# Patient Record
Sex: MALE | Race: WHITE | NOT HISPANIC OR LATINO | Employment: FULL TIME | URBAN - NONMETROPOLITAN AREA
[De-identification: names, ages, dates, MRNs, and addresses within clinical notes are randomized per-mention and may not be internally consistent; named-entity substitution may affect disease eponyms.]

---

## 2017-08-25 ENCOUNTER — OFFICE VISIT (OUTPATIENT)
Dept: FAMILY MEDICINE CLINIC | Facility: CLINIC | Age: 22
End: 2017-08-25

## 2017-08-25 VITALS
HEART RATE: 80 BPM | WEIGHT: 247 LBS | DIASTOLIC BLOOD PRESSURE: 84 MMHG | BODY MASS INDEX: 35.36 KG/M2 | OXYGEN SATURATION: 98 % | SYSTOLIC BLOOD PRESSURE: 136 MMHG | HEIGHT: 70 IN

## 2017-08-25 DIAGNOSIS — M54.42 CHRONIC MIDLINE LOW BACK PAIN WITH LEFT-SIDED SCIATICA: Primary | ICD-10-CM

## 2017-08-25 DIAGNOSIS — G89.29 CHRONIC MIDLINE LOW BACK PAIN WITH LEFT-SIDED SCIATICA: Primary | ICD-10-CM

## 2017-08-25 PROCEDURE — 99213 OFFICE O/P EST LOW 20 MIN: CPT | Performed by: NURSE PRACTITIONER

## 2017-08-25 RX ORDER — GABAPENTIN 300 MG/1
300 CAPSULE ORAL 3 TIMES DAILY
Qty: 90 CAPSULE | Refills: 2 | Status: SHIPPED | OUTPATIENT
Start: 2017-08-25 | End: 2017-11-21 | Stop reason: SDUPTHER

## 2017-08-25 RX ORDER — IBUPROFEN 600 MG/1
TABLET ORAL
Qty: 60 TABLET | Refills: 2 | Status: SHIPPED | OUTPATIENT
Start: 2017-08-25 | End: 2017-11-21 | Stop reason: SDUPTHER
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
## 2017-09-11 RX ORDER — TRAMADOL HYDROCHLORIDE 50 MG/1
TABLET ORAL
Qty: 60 TABLET | Refills: 0 | Status: SHIPPED | OUTPATIENT
Start: 2017-09-11 | End: 2017-11-21

## 2017-09-12 DIAGNOSIS — G89.29 CHRONIC LOW BACK PAIN, UNSPECIFIED BACK PAIN LATERALITY, WITH SCIATICA PRESENCE UNSPECIFIED: Primary | ICD-10-CM

## 2017-09-12 DIAGNOSIS — M54.5 CHRONIC LOW BACK PAIN, UNSPECIFIED BACK PAIN LATERALITY, WITH SCIATICA PRESENCE UNSPECIFIED: Primary | ICD-10-CM

## 2017-09-15 ENCOUNTER — HOSPITAL ENCOUNTER (OUTPATIENT)
Dept: PHYSICAL THERAPY | Facility: HOSPITAL | Age: 22
Setting detail: THERAPIES SERIES
Discharge: HOME | End: 2017-09-15

## 2017-09-15 DIAGNOSIS — M54.5 CHRONIC BILATERAL LOW BACK PAIN, WITH SCIATICA PRESENCE UNSPECIFIED: Primary | ICD-10-CM

## 2017-09-15 DIAGNOSIS — G89.29 CHRONIC BILATERAL LOW BACK PAIN, WITH SCIATICA PRESENCE UNSPECIFIED: Primary | ICD-10-CM

## 2017-09-15 PROCEDURE — 97161 PT EVAL LOW COMPLEX 20 MIN: CPT | Performed by: PHYSICAL THERAPIST

## 2017-09-15 PROCEDURE — 97110 THERAPEUTIC EXERCISES: CPT | Performed by: PHYSICAL THERAPIST

## 2017-09-15 NOTE — THERAPY EVALUATION
Outpatient Physical Therapy Ortho Initial Evaluation  [facility]     Patient Name: Fabricio Ware  : 1995  MRN: [id number]  Today's Date: 9/15/2017      Visit Date: 09/15/2017  Visit   Return to MD: NELY  Re-cert date: 10/6/17    There is no problem list on file for this patient.       History reviewed. No pertinent past medical history.     Past Surgical History:   Procedure Laterality Date   • EYE SURGERY         Visit Dx:     ICD-10-CM ICD-9-CM   1. Chronic bilateral low back pain, with sciatica presence unspecified M54.5 724.2    G89.29 338.29     Outpatient Medications     gabapentin (NEURONTIN) 300 MG capsule      ibuprofen (ADVIL,MOTRIN) 600 MG tablet      traMADol (ULTRAM) 50 MG tablet      Allergies: NKA            PT Ortho       09/15/17 0800    Subjective Comments    Subjective Comments 23 yo male with chronic LBP for the past 5 years, in [employer] at school and had severe low back pain and onset of left sided sciatica, went to the MD and was told he had a pinched nerve in his lumbar spine. 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 Pt c/o intermittent L LE sciatica travelling along L5 dermatome distally to the proximal lateral left thigh region.   -BS    Precautions and Contraindications    Precautions/Limitations no known precautions/limitations  -BS    Subjective Pain    Able to rate subjective pain? yes  -BS    Pre-Treatment Pain Level 5  -BS    Post-Treatment Pain Level 4  -BS    Quarter Clearing    Quarter Clearing Lower Quarter Clearing  -BS    Neural Tension Signs- Lower Quarter Clearing    SLR Bilateral:;Negative  -BS    Sensory Screen for Light Touch- Lower Quarter Clearing    L1 (inguinal area) Bilateral:;Intact  -BS    L2 (anterior mid thigh) Bilateral:;Intact  -BS    L3 (distal anterior thigh) Bilateral:;Intact  -BS    L4 (medial  lower leg/foot) Bilateral:;Intact  -BS    L5 (lateral lower leg/great toe) Bilateral:;Intact  -BS    S1 (bottom of foot) Bilateral:;Intact  -BS    Myotomal Screen- Lower Quarter Clearing    Hip flexion (L2) Bilateral:;5 (Normal)  -BS    Knee extension (L3) Bilateral:;5 (Normal)  -BS    Ankle DF (L4) Bilateral:;5 (Normal)  -BS    Great toe extension (L5) Bilateral:;5 (Normal)  -BS    Ankle PF (S1) Bilateral:;5 (Normal)  -BS    Knee flexion (S2) Bilateral:;5 (Normal)  -BS    Lumbar ROM Screen- Lower Quarter Clearing    Lumbar Flexion Normal  -BS    Lumbar Extension Impaired   50% mod limit  -BS    Lumbar Lateral Flexion Normal  -BS    Lumbar Rotation Normal  -BS    Special Tests/Palpation    Special Tests/Palpation Lumbar/SI  -BS    Lumbar/SI Special Tests    SI Compression Test (SI Dysfunction) Negative  -BS    SI Distraction Test (SI Dysfunction) Negative  -BS    ZENAIDA (hip vs. SI Dysfunction) Bilateral:;Positive  -BS      User Key  (r) = Recorded By, (t) = Taken By, (c) = Cosigned By    Initials Name Provider Type    MONICA Cannon, PT Physical Therapist                            Therapy Education       09/15/17 1300          Therapy Education    Given HEP;Symptoms/condition management;Posture/body mechanics;Pain management  -BS      Program New  -BS      How Provided Verbal;Written;Demonstration  -BS      Provided to Patient  -BS      Level of Understanding Verbalized;Demonstrated  -BS        User Key  (r) = Recorded By, (t) = Taken By, (c) = Cosigned By    Initials Name Provider Type    MONICA Cannon PT Physical Therapist                PT OP Goals       09/15/17 0900 09/15/17 0800    PT Short Term Goals    STG Date to Achieve 09/29/17  -BS     STG 1 Pt I with HEP  -BS     STG 1 Progress New  -BS     STG 2 Improve lumbar ext AROM to WNL  -BS     STG 2 Progress New  -BS     STG 3 Reduce LBP to 2/10 with heavy lifting  -BS     STG 3 Progress New  -BS     STG 4 Improve sitting tolerance to 90 minutes  -BS      STG 4 Progress New  -BS     Long Term Goals    LTG Date to Achieve 10/13/17  -BS     LTG 1 Improve core trunk stability to WFL  -BS     LTG 1 Progress New  -BS     LTG 2 Reduce LBP to 0-1/10 with heavy lifting  -BS     LTG 2 Progress New  -BS     LTG 3 Improve sitting tolerance to 2 hrs  -BS     LTG 3 Progress New  -BS     Time Calculation    PT Goal Re-Cert Due Date --  -BS 10/06/17  -BS      User Key  (r) = Recorded By, (t) = Taken By, (c) = Cosigned By    Initials Name Provider Type    BS Thomas Cannon, PT Physical Therapist                PT Assessment/Plan       09/15/17 1300       PT Assessment    Functional Limitations Impaired gait;Performance in work activities;Performance in self-care ADL;Performance in leisure activities;Limitation in home management  -BS     Impairments Gait;Impaired flexibility;Muscle strength;Pain;Range of motion;Poor body mechanics  -BS     Assessment Comments 21 yo male with chronic LBP with core trunk instability. Patient presents with lumbar spine AROM loss, B hamstrings tighness, core trunk instability and LBP.   -BS     Please refer to paper survey for additional self-reported information Yes  -BS     Rehab Potential Good  -BS     Patient/caregiver participated in establishment of treatment plan and goals Yes  -BS     Patient would benefit from skilled therapy intervention Yes  -BS     PT Plan    PT Frequency 2x/week  -BS     Predicted Duration of Therapy Intervention (days/wks) 4 weeks  -BS     Planned CPT's? PT EVAL LOW COMPLEXITY: 30879;PT RE-EVAL: 78190;PT THER PROC EA 15 MIN: 47437;PT THER ACT EA 15 MIN: 84780;PT MANUAL THERAPY EA 15 MIN: 83293;PT ELECTRICAL STIM UNATTEND: ;PT ULTRASOUND EA 15 MIN: 21775;PT TRACTION LUMBAR: 73184;PT HOT/COLD PACK WC NONMCARE: 47502;PT THER SUPP EA 15 MIN  -BS     Physical Therapy Interventions (Optional Details) home exercise program;joint mobilization;lumbar stabilization;manual therapy techniques;modalities;neuromuscular  "re-education;patient/family education;postural re-education;ROM (Range of Motion);stair training;strengthening;stretching  -BS     PT Plan Comments f/u with core trunk stability and prone progression exercises.  -BS       User Key  (r) = Recorded By, (t) = Taken By, (c) = Cosigned By    Initials Name Provider Type    MONICA Cannon, PT Physical Therapist                  Exercises       09/15/17 0800          Subjective Comments    Subjective Comments 23 yo male with chronic LBP for the past 5 years, in marching band at school and had severe low back pain and onset of left sided sciatica, went to the MD and was told he had a pinched nerve in his lumbar spine. 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 Pt c/o intermittent L LE sciatica travelling along L5 dermatome distally to the proximal lateral left thigh region.   -BS      Subjective Pain    Able to rate subjective pain? yes  -BS      Pre-Treatment Pain Level 5  -BS      Post-Treatment Pain Level 4  -BS      Exercise 1    Exercise Name 1 prone on elbows  -BS      Time (Minutes) 1 5'  -BS      Exercise 2    Exercise Name 2 prone press ups  -BS      Sets 2 1  -BS      Reps 2 15  -BS      Exercise 3    Exercise Name 3 pt ed sitting posture  -BS      Time (Minutes) 3 4'  -BS      Exercise 4    Exercise Name 4 supine HS stretch  -BS      Sets 4 1  -BS      Reps 4 1  -BS      Time (Seconds) 4 20\"  -BS        User Key  (r) = Recorded By, (t) = Taken By, (c) = Cosigned By    Initials Name Provider Type    MONICA Cannon PT Physical Therapist                              Outcome Measures       09/15/17 0900          Modified Oswestry    Modified Oswestry Score/Comments 16-32%  -BS      Functional Assessment    Outcome Measure Options Modifed Owestry  -BS        User Key  (r) = Recorded By, (t) = Taken By, (c) = Cosigned By    Initials Name Provider " Type    BS Thomas Cannon, PT Physical Therapist            Time Calculation:   Start Time: 0850  Stop Time: 0930  Time Calculation (min): 40 min     Therapy Charges for Today     Code Description Service Date Service Provider Modifiers Qty    22441304965 HC PT EVAL LOW COMPLEXITY 2 9/15/2017 Thomas Cannon, PT GP 1    23318965178 HC PT THER PROC EA 15 MIN 9/15/2017 Thomas Cannon, PT GP 1          PT G-Codes  Outcome Measure Options: Modifed Owestry         Thomas Cannon, PT  9/15/2017

## 2017-09-19 RX ORDER — PHENTERMINE HYDROCHLORIDE 30 MG/1
30 CAPSULE ORAL EVERY MORNING
Qty: 30 CAPSULE | Refills: 0 | Status: SHIPPED | OUTPATIENT
Start: 2017-09-19 | End: 2017-10-19 | Stop reason: SDUPTHER

## 2017-09-19 RX ORDER — HYDROCODONE BITARTRATE AND ACETAMINOPHEN 7.5; 325 MG/1; MG/1
TABLET ORAL
Qty: 28 TABLET | Refills: 0 | Status: SHIPPED | OUTPATIENT
Start: 2017-09-19 | End: 2017-10-16 | Stop reason: SDUPTHER

## 2017-09-21 ENCOUNTER — APPOINTMENT (OUTPATIENT)
Dept: PHYSICAL THERAPY | Facility: HOSPITAL | Age: 22
End: 2017-09-21

## 2017-09-25 ENCOUNTER — HOSPITAL ENCOUNTER (OUTPATIENT)
Dept: PHYSICAL THERAPY | Facility: HOSPITAL | Age: 22
Setting detail: THERAPIES SERIES
Discharge: HOME | End: 2017-09-25

## 2017-09-25 PROCEDURE — 97110 THERAPEUTIC EXERCISES: CPT

## 2017-09-25 NOTE — THERAPY TREATMENT NOTE
Outpatient Physical Therapy Ortho Treatment Note  [facility]  Urszula Guerrero PTA       Patient Name: Fabricio Ware  : 1995  MRN: [id number]  Today's Date: 2017      Visit Date: 2017     Visits: 2/3  Insurance Visits Approved: 9 visits  Recert Due: 10/06/2017  MD Appt: 2017  Pain: pretreatment 4/10; post treatment 2/10  Improvement: pt is subjectively reporting 0% improvement since initial evaluation    Visit Dx:  No diagnosis found.    There is no problem list on file for this patient.       No past medical history on file.     Past Surgical History:   Procedure Laterality Date   • EYE SURGERY               PT Ortho       17 170    Precautions and Contraindications    Precautions/Limitations no known precautions/limitations  -    Subjective Pain    Able to rate subjective pain? yes  -    Pre-Treatment Pain Level 4  -    Post-Treatment Pain Level 2  -      User Key  (r) = Recorded By, (t) = Taken By, (c) = Cosigned By    Initials Name Provider Type     Urszula Guerrero PTA Physical Therapy Assistant                            PT Assessment/Plan       17 170       PT Assessment    Assessment Comments good peformance with therex. is very conversive during treatment and shows no signs of distress throughout.   -     PT Plan    PT Frequency 2x/week  -     PT Plan Comments next visit prone alt hip ext  -       User Key  (r) = Recorded By, (t) = Taken By, (c) = Cosigned By    Initials Name Provider Type     Urszula Guerrero PTA Physical Therapy Assistant                    Exercises       17 1700          Subjective Comments    Subjective Comments states that they added a new medication to him and that it has his pain a little more in control now. states that he wasn't able to control his pain before they added the Lortab 7.5's. states that the didn't discontinue any of his other medication.   -      Subjective Pain    Able to rate  subjective pain? yes  -MH      Pre-Treatment Pain Level 4  -MH      Post-Treatment Pain Level 2  -MH      Exercise 1    Exercise Name 1 Pro II LE  -MH      Time (Minutes) 1 10 minutes  -MH      Time (Seconds) 1 5 sec hold  -MH      Exercise 2    Exercise Name 2 B St. HS S  -MH      Reps 2 2  -MH      Time (Seconds) 2 30 sec hold  -MH      Exercise 3    Exercise Name 3 B Seated Piriformis S  -MH      Reps 3 2  -MH      Time (Seconds) 3 30 sec hold  -MH      Exercise 4    Exercise Name 4 LTR  -MH      Reps 4 10  -MH      Time (Seconds) 4 10 second   -MH      Exercise 5    Exercise Name 5 prone press ups  -MH      Reps 5 15  -MH      Time (Seconds) 5 5-10 seconds  -MH      Exercise 6    Exercise Name 6 Prone Hamcurls  -MH      Reps 6 20  -MH      Exercise 7    Exercise Name 7 PARESH  -MH      Time (Minutes) 7 5 minutes  -        User Key  (r) = Recorded By, (t) = Taken By, (c) = Cosigned By    Initials Name Provider Type    RAMONE Guerrero PTA Physical Therapy Assistant                               PT OP Goals       09/25/17 1700       PT Short Term Goals    STG Date to Achieve 09/29/17  -     STG 1 Pt I with HEP  -     STG 1 Progress Progressing  -     STG 2 Improve lumbar ext AROM to WNL  -     STG 2 Progress Progressing  -     STG 3 Reduce LBP to 2/10 with heavy lifting  -     STG 3 Progress Ongoing  -     STG 4 Improve sitting tolerance to 90 minutes  -     STG 4 Progress Ongoing  -     Long Term Goals    LTG Date to Achieve 10/13/17  -     LTG 1 Improve core trunk stability to WFL  -     LTG 1 Progress Ongoing  -     LTG 2 Reduce LBP to 0-1/10 with heavy lifting  -     LTG 2 Progress Ongoing  -     LTG 3 Improve sitting tolerance to 2 hrs  -     LTG 3 Progress Ongoing  -     Time Calculation    PT Goal Re-Cert Due Date 10/06/17  -       User Key  (r) = Recorded By, (t) = Taken By, (c) = Cosigned By    Initials Name Provider Type    RAMONE Guerrero PTA Physical Therapy  Assistant                Therapy Education       09/25/17 1700          Therapy Education    Education Details LTR, Bridges  -MH      Given HEP;Symptoms/condition management;Pain management;Posture/body mechanics  -MH      Program Progressed  -MH      How Provided Verbal;Demonstration;Written  -MH      Provided to Patient  -MH      Level of Understanding Verbalized;Demonstrated  -MH        User Key  (r) = Recorded By, (t) = Taken By, (c) = Cosigned By    Initials Name Provider Type    MH Urszula Guerrero PTA Physical Therapy Assistant                Time Calculation:   Start Time: 1640  Stop Time: 1734  Time Calculation (min): 54 min    Therapy Charges for Today     Code Description Service Date Service Provider Modifiers Qty    24685974617 HC PT THER PROC EA 15 MIN 9/25/2017 Usrzula Guerrero PTA GP 4    05867653185 HC PT THER SUPP EA 15 MIN 9/25/2017 Urszula Guerrero PTA GP 1                    Urszula Guerrero PTA  9/25/2017

## 2017-09-27 ENCOUNTER — HOSPITAL ENCOUNTER (OUTPATIENT)
Dept: PHYSICAL THERAPY | Facility: HOSPITAL | Age: 22
Setting detail: THERAPIES SERIES
Discharge: HOME | End: 2017-09-27

## 2017-09-27 DIAGNOSIS — G89.29 CHRONIC BILATERAL LOW BACK PAIN, WITH SCIATICA PRESENCE UNSPECIFIED: Primary | ICD-10-CM

## 2017-09-27 DIAGNOSIS — M54.5 CHRONIC BILATERAL LOW BACK PAIN, WITH SCIATICA PRESENCE UNSPECIFIED: Primary | ICD-10-CM

## 2017-09-27 PROCEDURE — 97110 THERAPEUTIC EXERCISES: CPT | Performed by: PHYSICAL THERAPY ASSISTANT

## 2017-09-27 NOTE — THERAPY TREATMENT NOTE
Outpatient Physical Therapy Ortho Treatment Note  [facility] [place]     Patient Name: Fabricio Ware  : 1995  MRN: [id number]  Today's Date: 2017      Visit Date: 2017    Visits 3/4   Recert Date 10/6/17   MD appointment 17   Pt reported % of improvement     9 visits approved    Visit Dx:    ICD-10-CM ICD-9-CM   1. Chronic bilateral low back pain, with sciatica presence unspecified M54.5 724.2    G89.29 338.29       There is no problem list on file for this patient.       No past medical history on file.     Past Surgical History:   Procedure Laterality Date   • EYE SURGERY               PT Ortho       17 0800    Subjective Pain    Post-Treatment Pain Level 5  -      17 1700    Precautions and Contraindications    Precautions/Limitations no known precautions/limitations  -    Subjective Pain    Able to rate subjective pain? yes  -    Pre-Treatment Pain Level 4  -    Post-Treatment Pain Level 2  -      User Key  (r) = Recorded By, (t) = Taken By, (c) = Cosigned By    Initials Name Provider Type     Urszula Guerrero PTA Physical Therapy Assistant     Darnell Haney PTA Physical Therapy Assistant                            PT Assessment/Plan       17 0800       PT Assessment    Assessment Comments good tolerance with ther ex this date, added 2 exercise to HEP with good technique demostrated  -     PT Plan    PT Frequency 2x/week  -     PT Plan Comments bridges on pball next visit  -       User Key  (r) = Recorded By, (t) = Taken By, (c) = Cosigned By    Initials Name Provider Type    OPAL Haney PTA Physical Therapy Assistant                Modalities       17 0800          Subjective Comments    Subjective Comments Pt reports he is complaint with HEP,   -      Ice    Patient denies application of Ice Yes  -        User Key  (r) = Recorded By, (t) = Taken By, (c) = Cosigned By    Initials Name Provider Type    OPAL OSORIO  BERNICE Haney Physical Therapy Assistant                Exercises       09/27/17 0800          Subjective Comments    Subjective Comments Pt reports he is complaint with HEP,   -JH      Subjective Pain    Able to rate subjective pain? yes  -JH      Pre-Treatment Pain Level 6  -JH      Post-Treatment Pain Level 5  -JH      Exercise 1    Exercise Name 1 Pro II LE  -JH      Time (Minutes) 1 10 minutes  -JH      Additional Comments L 4.0  -JH      Exercise 2    Exercise Name 2 B St. HS S  -JH      Reps 2 2  -JH      Time (Seconds) 2 30 sec hold  -JH      Exercise 3    Exercise Name 3 B Seated Piriformis S  -JH      Reps 3 2  -JH      Time (Seconds) 3 30 sec hold  -JH      Exercise 4    Exercise Name 4 LTR  -JH      Reps 4 10  -JH      Time (Seconds) 4 10 second   -JH      Exercise 5    Exercise Name 5 bridges  -JH      Sets 5 2  -JH      Reps 5 10  -JH      Time (Seconds) 5 5 sec hold  -JH      Exercise 6    Exercise Name 6 PARESH  -JH      Time (Minutes) 6 5  -JH      Exercise 7    Exercise Name 7 Prone press up  -JH      Sets 7 2  -JH      Reps 7 10  -JH      Time (Seconds) 7 10 sec hold  -JH      Exercise 8    Exercise Name 8 B prone ham curl  -JH      Sets 8 2  -JH      Reps 8 10  -JH      Exercise 9    Exercise Name 9 B prone hip ext  -JH      Sets 9 2  -JH      Reps 9 10  -JH      Exercise 10    Exercise Name 10 sit to stand with Lx ext  -JH      Sets 10 2  -JH      Reps 10 10  -JH      Time (Seconds) 10 5 sec hold  -JH      Exercise 11    Exercise Name 11 hip flexion on pball  -JH      Sets 11 2  -JH      Reps 11 10  -JH      Exercise 12    Exercise Name 12 LAQ on pball  -JH      Sets 12 2  -JH      Reps 12 10  -JH        User Key  (r) = Recorded By, (t) = Taken By, (c) = Cosigned By    Initials Name Provider Type    OPLA Haney PTA Physical Therapy Assistant                               PT OP Goals       09/27/17 0800       PT Short Term Goals    STG Date to Achieve 09/29/17  -JH     STG 1 Pt I with HEP  -JH      STG 1 Progress Progressing  -     STG 2 Improve lumbar ext AROM to WNL  -     STG 2 Progress Progressing  -     STG 3 Reduce LBP to 2/10 with heavy lifting  -     STG 3 Progress Ongoing  -     STG 4 Improve sitting tolerance to 90 minutes  -     STG 4 Progress Ongoing  -     Long Term Goals    LTG Date to Achieve 10/13/17  -     LTG 1 Improve core trunk stability to WFL  -     LTG 1 Progress Ongoing  -     LTG 2 Reduce LBP to 0-1/10 with heavy lifting  -     LTG 2 Progress Ongoing  -     LTG 3 Improve sitting tolerance to 2 hrs  -     LTG 3 Progress Ongoing  -     Time Calculation    PT Goal Re-Cert Due Date 10/06/17  -       User Key  (r) = Recorded By, (t) = Taken By, (c) = Cosigned By    Initials Name Provider Type     Darnell Haney PTA Physical Therapy Assistant                    Time Calculation:   Start Time: 0802  Stop Time: 0855  Time Calculation (min): 53 min    Therapy Charges for Today     Code Description Service Date Service Provider Modifiers Qty    05053766533  PT THER PROC EA 15 MIN 9/27/2017 Darnell Haney PTA GP 4                    Darnell Haney PTA  9/27/2017

## 2017-10-05 ENCOUNTER — HOSPITAL ENCOUNTER (OUTPATIENT)
Dept: PHYSICAL THERAPY | Facility: HOSPITAL | Age: 22
Setting detail: THERAPIES SERIES
Discharge: HOME | End: 2017-10-05

## 2017-10-05 DIAGNOSIS — M54.5 CHRONIC BILATERAL LOW BACK PAIN, WITH SCIATICA PRESENCE UNSPECIFIED: Primary | ICD-10-CM

## 2017-10-05 DIAGNOSIS — G89.29 CHRONIC BILATERAL LOW BACK PAIN, WITH SCIATICA PRESENCE UNSPECIFIED: Primary | ICD-10-CM

## 2017-10-05 PROCEDURE — 97110 THERAPEUTIC EXERCISES: CPT | Performed by: PHYSICAL THERAPIST

## 2017-10-06 NOTE — THERAPY PROGRESS REPORT/RE-CERT
Outpatient Physical Therapy Ortho Re-Assessment  [facility]      Patient Name: Fabricio Ware  : 1995  MRN: [id number]  Today's Date: 10/5/2017      Visit Date: 10/05/2017     Pt attended 4/4 scheduled visits. .  Re-cert date 10/26  Pt will RTD 17  Pt has 9 approved visits.       There is no problem list on file for this patient.       History reviewed. No pertinent past medical history.     Past Surgical History:   Procedure Laterality Date   • CHOLECYSTECTOMY     • EYE SURGERY         Visit Dx:     ICD-10-CM ICD-9-CM   1. Chronic bilateral low back pain, with sciatica presence unspecified M54.5 724.2    G89.29 338.29     Medications (Admitted on 10/5/2017)     Outpatient Medications     gabapentin (NEURONTIN) 300 MG capsule      HYDROcodone-acetaminophen (NORCO) 7.5-325 MG per tablet      ibuprofen (ADVIL,MOTRIN) 600 MG tablet      phentermine 30 MG capsule      traMADol (ULTRAM) 50 MG tablet                  PT Ortho       10/05/17 1900    ROM (Range of Motion)    General ROM Detail Trunk ROM: flexion 50 with c/o pulling in lower back, improved to 60 after repetition; ext 15, B SB 20  with c/o stiffness, increased with min pain after repetition; B rotation WFL , no c/o   -LF    MMT (Manual Muscle Testing)    General MMT Assessment Detail fair core stabilization with testing  -LF    Transfers    Transfer, Comment Independent  -LF    Gait Assessment/Treatment    Gait, Comment normalized  -LF      10/05/17 1400    Subjective Pain    Able to rate subjective pain? yes  -LF    Pre-Treatment Pain Level 3  -LF      User Key  (r) = Recorded By, (t) = Taken By, (c) = Cosigned By    Initials Name Provider Type    LF Debby Reyna, PT Physical Therapist                            Therapy Education       10/05/17 2000          Therapy Education    Given HEP;Symptoms/condition management;Pain management;Posture/body mechanics  -LF      Program New  -LF      How Provided  Verbal;Demonstration;Written  -LF      Provided to Patient  -LF      Level of Understanding Teach back education performed;Verbalized;Demonstrated  -LF        User Key  (r) = Recorded By, (t) = Taken By, (c) = Cosigned By    Initials Name Provider Type    LF Debby Reyna, PT Physical Therapist                PT OP Goals       10/05/17 1400       PT Short Term Goals    STG Date to Achieve 10/19/17  -LF     STG 1 Pt I with HEP each session  -LF     STG 1 Progress Progressing  -LF     STG 2 Pt will demonstrate 75% trunk ROM with min c/o   -LF     STG 2 Progress Progressing  -LF     STG 3 Pt will demonstrate 3x10 mini squats, no UE support with proper positioning and posture- core stabilization  -LF     STG 3 Progress Ongoing  -LF     STG 4 Pt will report sleeping throughout the night with min interuption due to pain   -LF     STG 4 Progress New  -LF     Long Term Goals    LTG Date to Achieve 11/02/17  -LF     LTG 1 Improve core trunk stability to perform quadruped > plank, 5 x , holding 10 sec  -LF     LTG 1 Progress New  -LF     LTG 2 Pt will demonstrate proper lifting mechanics  -LF     LTG 2 Progress New  -LF     LTG 3 Pt will demonstrate functional trunk ROM with min c/o pain  -LF     LTG 3 Progress New  -LF     Time Calculation    PT Goal Re-Cert Due Date 10/26/17  -LF       User Key  (r) = Recorded By, (t) = Taken By, (c) = Cosigned By    Initials Name Provider Type     Debby Reyna PT Physical Therapist                PT Assessment/Plan       10/05/17 2000       PT Assessment    Functional Limitations Performance in work activities;Performance in sport activities;Performance in leisure activities  -LF     Impairments Impaired muscle length;Pain;Muscle strength;Range of motion  -LF     Assessment Comments Pt presents with improving back pain, improved AROM, posture, HEP knowledge. Pt demonstrates neural tension which improved after stretching. Pt demonstrates fair core strength/ stabilization, fair mechanics  with lifting activites, lack of HEP progression, Pt would benefit from continued skilled intervention to address the above mentioned deficits.   -LF     Please refer to paper survey for additional self-reported information Yes  -LF     Rehab Potential Good  -LF     Patient/caregiver participated in establishment of treatment plan and goals Yes  -LF     Patient would benefit from skilled therapy intervention Yes  -LF     PT Plan    PT Frequency 2x/week  -LF     Predicted Duration of Therapy Intervention (days/wks) 3 weeks  -LF     Planned CPT's? PT THER PROC EA 15 MIN: 48877;PT THER ACT EA 15 MIN: 28981;PT NEUROMUSC RE-EDUCATION EA 15 MIN: 02455  -LF     Physical Therapy Interventions (Optional Details) lumbar stabilization;home exercise program;neuromuscular re-education;postural re-education;patient/family education;strengthening;stretching  -LF     PT Plan Comments Continue therapy wiwth progressive core/ trunk strengthening and stabilization program, educatoin regarding proper body mechanics with lifting/ labor activites, progressiv eHEP instruction.   -LF       User Key  (r) = Recorded By, (t) = Taken By, (c) = Cosigned By    Initials Name Provider Type    LF Debby Reyna, PT Physical Therapist                  Exercises       10/05/17 1400          Subjective Comments    Subjective Comments States awoke feeling better than usual.   -LF      Subjective Pain    Able to rate subjective pain? yes  -LF      Pre-Treatment Pain Level 3  -LF      Exercise 1    Exercise Name 1 Pro II  -LF      Time (Minutes) 1 10  -LF      Exercise 2    Exercise Name 2 TAB  -LF      Cueing 2 Verbal;Demo  -LF      Time (Minutes) 2 6  -LF      Additional Comments Explained, demo, performed, educated for HEP   -LF      Exercise 3    Exercise Name 3 seated trunk B SB repeated/ repeated trunk rotation  -LF      Cueing 3 Verbal;Demo  -LF      Sets 3 2  -LF      Reps 3 10  -LF      Additional Comments Explained, demo, performed, educated for  HEP   -LF      Exercise 4    Exercise Name 4 seated HS stretching with lumbar lordosis  -LF      Cueing 4 Demo;Verbal  -LF      Sets 4 2  -LF      Reps 4 10  -LF      Additional Comments Explained, demo, performed, educated for HEP   -LF      Exercise 5    Exercise Name 5 mini squats with chair fo rhands and knee cueing  -LF      Cueing 5 Demo  -LF      Sets 5 2  -LF      Reps 5 10  -LF      Additional Comments Explained, demo, performed, educated for HEP   -LF      Exercise 6    Exercise Name 6 discussed aerobic activity, stretching, postural correction, sitting parameters, neural tension/ lengthening  -LF      Cueing 6 Verbal  -LF      Time (Minutes) 6 8  -LF      Additional Comments Reviewed for HEP   -LF        User Key  (r) = Recorded By, (t) = Taken By, (c) = Cosigned By    Initials Name Provider Type    DENISA Reyna PT Physical Therapist                              Outcome Measures       10/05/17 1400          Modified Oswestry    Modified Oswestry Score/Comments 11=22%  -LF      Functional Assessment    Outcome Measure Options Modifed Owestry  -LF        User Key  (r) = Recorded By, (t) = Taken By, (c) = Cosigned By    Initials Name Provider Type    DENISA Reyna, PT Physical Therapist            Time Calculation:   Start Time: 1400  Stop Time: 1440  Time Calculation (min): 40 min  Total Timed Code Minutes- PT: 40 minute(s)     Therapy Charges for Today     Code Description Service Date Service Provider Modifiers Qty    15879550440  PT THER PROC EA 15 MIN 10/5/2017 Debby Reyna, PT GP 3          PT G-Codes  Outcome Measure Options: Modifed Owestry         Debby Reyna, PT  10/5/2017

## 2017-10-10 ENCOUNTER — APPOINTMENT (OUTPATIENT)
Dept: PHYSICAL THERAPY | Facility: HOSPITAL | Age: 22
End: 2017-10-10

## 2017-10-12 ENCOUNTER — APPOINTMENT (OUTPATIENT)
Dept: PHYSICAL THERAPY | Facility: HOSPITAL | Age: 22
End: 2017-10-12

## 2017-10-16 RX ORDER — HYDROCODONE BITARTRATE AND ACETAMINOPHEN 7.5; 325 MG/1; MG/1
TABLET ORAL
Qty: 28 TABLET | Refills: 0 | Status: SHIPPED | OUTPATIENT
Start: 2017-10-16 | End: 2017-11-21 | Stop reason: SDUPTHER

## 2017-10-17 ENCOUNTER — HOSPITAL ENCOUNTER (OUTPATIENT)
Dept: PHYSICAL THERAPY | Facility: HOSPITAL | Age: 22
Setting detail: THERAPIES SERIES
Discharge: HOME | End: 2017-10-17

## 2017-10-17 DIAGNOSIS — G89.29 CHRONIC BILATERAL LOW BACK PAIN, WITH SCIATICA PRESENCE UNSPECIFIED: Primary | ICD-10-CM

## 2017-10-17 DIAGNOSIS — M54.5 CHRONIC BILATERAL LOW BACK PAIN, WITH SCIATICA PRESENCE UNSPECIFIED: Primary | ICD-10-CM

## 2017-10-17 PROCEDURE — 97110 THERAPEUTIC EXERCISES: CPT

## 2017-10-17 NOTE — THERAPY TREATMENT NOTE
Outpatient Physical Therapy Ortho Treatment Note  [facility]  Urszula Guerrero PTA       Patient Name: Fabricio Ware  : 1995  MRN: [id number]  Today's Date: 10/17/2017      Visit Date: 10/17/2017     Visits:   Insurance Visits Approved: 9 visits  Recert Due: 10/26/2017  MD Appt: TBD  Pain: pretreatment 5/10; post treatment 3/10  Improvement: pt is subjectively reporting does not rate% improvement since initial evaluation    Visit Dx:    ICD-10-CM ICD-9-CM   1. Chronic bilateral low back pain, with sciatica presence unspecified M54.5 724.2    G89.29 338.29       There is no problem list on file for this patient.       No past medical history on file.     Past Surgical History:   Procedure Laterality Date   • CHOLECYSTECTOMY     • EYE SURGERY               PT Ortho       10/17/17 1300    Subjective Comments    Subjective Comments states woke up with pain. reports took his pain medication and now its better. doing alright today. states that he is sleeping through the night except for his son waking him.    -    Precautions and Contraindications    Precautions/Limitations no known precautions/limitations  -    Subjective Pain    Able to rate subjective pain? yes  -    Pre-Treatment Pain Level 5  -    ROM (Range of Motion)    General ROM Detail Trunk ROM: Flex 125° (fingers to floor); B SB WNL; B ROT WNL; Ext 26°  -      User Key  (r) = Recorded By, (t) = Taken By, (c) = Cosigned By    Initials Name Provider Type     Urszula Guerrero PTA Physical Therapy Assistant                            PT Assessment/Plan       10/17/17 1300       PT Assessment    Assessment Comments demonstrates appropriate mini squat technique with no issues. has improved AROM lumbar spine with extension having the only deficit  -     PT Plan    PT Frequency 2x/week  -     PT Plan Comments box lifting at lift station  -       User Key  (r) = Recorded By, (t) = Taken By, (c) = Cosigned By     Initials Name Provider Type     Urszula Guerrero PTA Physical Therapy Assistant                Modalities       10/17/17 1300          Subjective Pain    Post-Treatment Pain Level 3  -        User Key  (r) = Recorded By, (t) = Taken By, (c) = Cosigned By    Initials Name Provider Type     Urszula HEBERT BERNICE Guerrero Physical Therapy Assistant                Exercises       10/17/17 1300          Subjective Comments    Subjective Comments states woke up with pain. reports took his pain medication and now its better. doing alright today. states that he is sleeping through the night except for his son waking him.    -      Subjective Pain    Able to rate subjective pain? yes  -      Pre-Treatment Pain Level 5  -      Post-Treatment Pain Level 3  -      Exercise 1    Exercise Name 1 Pro II LE's  -      Time (Minutes) 1 10 minutes  -      Exercise 2    Exercise Name 2 B St. HS S  -      Reps 2 2  -MH      Time (Seconds) 2 30 sec hold  -      Exercise 3    Exercise Name 3 B Seated Piriformis S  -MH      Reps 3 2  -MH      Time (Seconds) 3 30 sec hold  -      Exercise 4    Exercise Name 4 Sit to/from Stand with Lx Ext  -      Reps 4 20  -MH      Time (Seconds) 4 5 sec hold  -      Exercise 5    Exercise Name 5 B Seated Trunk Lateral Sidebend  -      Reps 5 20  -MH      Time (Seconds) 5 5 sec hold  -      Exercise 6    Exercise Name 6 mini squats with chair for hands and knee cueing  -      Reps 6 20  -MH      Exercise 7    Exercise Name 7 track walk with focus on posture   -      Reps 7 4  -MH      Additional Comments laps (1/4 mile)  -      Exercise 8    Exercise Name 8 mini squat no chair for cues  -      Sets 8 3  -MH      Reps 8 10  -MH      Exercise 9    Exercise Name 9 QP > plank  -      Reps 9 10  -MH      Time (Seconds) 9 10 sec hold  -        User Key  (r) = Recorded By, (t) = Taken By, (c) = Cosigned By    Initials Name Provider Type     Urszula Guerrero PTA Physical Therapy  Assistant                               PT OP Goals       10/17/17 1300       PT Short Term Goals    STG Date to Achieve 10/19/17  -     STG 1 Pt I with HEP each session  -     STG 1 Progress Progressing  -     STG 2 Pt will demonstrate 75% trunk ROM with min c/o   -     STG 2 Progress Met  -     STG 3 Pt will demonstrate 3x10 mini squats, no UE support with proper positioning and posture- core stabilization  -     STG 3 Progress Met  -     STG 4 Pt will report sleeping throughout the night with min interuption due to pain   -     STG 4 Progress Met  -     Long Term Goals    LTG Date to Achieve 11/02/17  -     LTG 1 Improve core trunk stability to perform quadruped > plank, 5 x , holding 10 sec  -     LTG 1 Progress Met  -     LTG 2 Pt will demonstrate proper lifting mechanics  -     LTG 2 Progress Ongoing  -     LTG 3 Pt will demonstrate functional trunk ROM with min c/o pain  -     LTG 3 Progress Ongoing  -     Time Calculation    PT Goal Re-Cert Due Date 10/26/17  -       User Key  (r) = Recorded By, (t) = Taken By, (c) = Cosigned By    Initials Name Provider Type     Urszula Guerrero PTA Physical Therapy Assistant                Therapy Education       10/17/17 1300          Therapy Education    Education Details quadrapend to plank to HEP and to increase walking for exercise  -      Given HEP;Symptoms/condition management;Pain management;Posture/body mechanics  -      Program Reinforced  -      How Provided Verbal;Demonstration  -      Provided to Patient  -      Level of Understanding Teach back education performed;Verbalized;Demonstrated  -        User Key  (r) = Recorded By, (t) = Taken By, (c) = Cosigned By    Initials Name Provider Type    RAMONE Guerrero PTA Physical Therapy Assistant                Time Calculation:   Start Time: 1345  Stop Time: 1431  Time Calculation (min): 46 min  Total Timed Code Minutes- PT: 46 minute(s)    Therapy Charges for Today      Code Description Service Date Service Provider Modifiers Qty    74925629100 HC PT THER PROC EA 15 MIN 10/17/2017 Urszula Guerreor, PTA GP 3    44046086500 HC PT THER SUPP EA 15 MIN 10/17/2017 Urszula Guerrero PTA GP 1                    Urszula Guerrero, BERNICE  10/17/2017

## 2017-10-19 ENCOUNTER — APPOINTMENT (OUTPATIENT)
Dept: PHYSICAL THERAPY | Facility: HOSPITAL | Age: 22
End: 2017-10-19

## 2017-10-19 RX ORDER — PHENTERMINE HYDROCHLORIDE 30 MG/1
30 CAPSULE ORAL EVERY MORNING
Qty: 30 CAPSULE | Refills: 0 | Status: CANCELLED | OUTPATIENT
Start: 2017-10-19

## 2017-10-19 RX ORDER — PHENTERMINE HYDROCHLORIDE 30 MG/1
30 CAPSULE ORAL EVERY MORNING
Qty: 30 CAPSULE | Refills: 0 | Status: SHIPPED | OUTPATIENT
Start: 2017-10-19 | End: 2017-11-21 | Stop reason: SDUPTHER

## 2017-10-24 ENCOUNTER — HOSPITAL ENCOUNTER (OUTPATIENT)
Dept: PHYSICAL THERAPY | Facility: HOSPITAL | Age: 22
Setting detail: THERAPIES SERIES
Discharge: HOME | End: 2017-10-24

## 2017-10-24 DIAGNOSIS — G89.29 CHRONIC BILATERAL LOW BACK PAIN, WITH SCIATICA PRESENCE UNSPECIFIED: Primary | ICD-10-CM

## 2017-10-24 DIAGNOSIS — M54.5 CHRONIC BILATERAL LOW BACK PAIN, WITH SCIATICA PRESENCE UNSPECIFIED: Primary | ICD-10-CM

## 2017-10-24 PROCEDURE — 97110 THERAPEUTIC EXERCISES: CPT

## 2017-10-24 NOTE — THERAPY TREATMENT NOTE
Outpatient Physical Therapy Ortho Treatment Note  [facility]  Urszula Guerrero PTA       Patient Name: Fabricio Ware  : 1995  MRN: [id number]  Today's Date: 10/24/2017      Visit Date: 10/24/2017     Visits:   Insurance Visits Approved: 9 visits  Recert Due: 10/26/2017  MD Appt: TBD  Pain: pretreatment 6-7/10; post treatment -5/10  Improvement: pt is subjectively reporting 75% improvement since initial evaluation    Visit Dx:    ICD-10-CM ICD-9-CM   1. Chronic bilateral low back pain, with sciatica presence unspecified M54.5 724.2    G89.29 338.29       There is no problem list on file for this patient.       No past medical history on file.     Past Surgical History:   Procedure Laterality Date   • CHOLECYSTECTOMY     • EYE SURGERY               PT Ortho       10/24/17 1400    Subjective Comments    Subjective Comments states that he has been doing some exercises every day but doesn't get all of them every day. states that he hasn't gotten to do much in the way of increasing walking at home secondary to being in a vehichle a lot the last week.   -    Precautions and Contraindications    Precautions/Limitations no known precautions/limitations  -    Subjective Pain    Able to rate subjective pain? yes  -    Pre-Treatment Pain Level --   6-7/10  -    Post-Treatment Pain Level --   4-5/10 with diminishing radicular symptoms  -    Posture/Observations    Posture/Observations Comments patient arrives 12 minutes late for treatment.   -    ROM (Range of Motion)    General ROM Detail --   Trunk Ext 28°  -      User Key  (r) = Recorded By, (t) = Taken By, (c) = Cosigned By    Initials Name Provider Type     Urszula Guerrero PTA Physical Therapy Assistant                            PT Assessment/Plan       10/24/17 1500       PT Assessment    Assessment Comments demonstrating appropriate lifting techniques. only goal not achieved at this time is regarding ROM, he has  normal ROM of lumbar spine in all directions with exception to lumbar extension. good effort throughout treatment. displays independence with HEP  -     PT Plan    PT Frequency 2x/week  -     PT Plan Comments recheck with planned discharge at next treatment.   -       User Key  (r) = Recorded By, (t) = Taken By, (c) = Cosigned By    Initials Name Provider Type     Urszula Guerrero, PTA Physical Therapy Assistant                    Exercises       10/24/17 1400          Subjective Comments    Subjective Comments states that he has been doing some exercises every day but doesn't get all of them every day. states that he hasn't gotten to do much in the way of increasing walking at home secondary to being in a vehichle a lot the last week.   -      Subjective Pain    Able to rate subjective pain? yes  -      Pre-Treatment Pain Level --   6-7/10  -      Post-Treatment Pain Level --   4-5/10 with diminishing radicular symptoms  -      Exercise 1    Exercise Name 1 Pro II LE's  -      Time (Minutes) 1 10 minutes  -      Additional Comments L 6.0  -      Exercise 2    Exercise Name 2 B St. HS S  -      Reps 2 1  -      Time (Seconds) 2 30 sec hold  -      Exercise 3    Exercise Name 3 B Seated Piriformis S  -      Reps 3 10  -      Time (Seconds) 3 30 sec hold  -      Exercise 4    Exercise Name 4 Sit to/from Stand with Lx Ext  -      Reps 4 10  -      Time (Seconds) 4 5 sec hold  -      Exercise 5    Exercise Name 5 B Seated Trunk Lateral Sidebend  -      Reps 5 10  -      Time (Seconds) 5 5 sec hold  -      Exercise 6    Exercise Name 6 QP to Plank  -      Reps 6 10  -      Additional Comments 10 sec hold  -      Exercise 7    Exercise Name 7 Side Planks B  -      Reps 7 10  -      Additional Comments 10 sec hold  -      Exercise 8    Exercise Name 8 track walk  -      Additional Comments 8 laps  -      Exercise 9    Exercise Name 9 Box Lift with Progressive  weight Increase  -      Reps 9 10  -      Additional Comments 0-40# floor to waist to shoulder and back  -        User Key  (r) = Recorded By, (t) = Taken By, (c) = Cosigned By    Initials Name Provider Type     Urszula Guerrero PTA Physical Therapy Assistant                               PT OP Goals       10/24/17 1400       PT Short Term Goals    STG Date to Achieve 10/19/17  -     STG 1 Pt I with HEP each session  -     STG 1 Progress Met  -     STG 2 Pt will demonstrate 75% trunk ROM with min c/o   -     STG 2 Progress Met  -     STG 3 Pt will demonstrate 3x10 mini squats, no UE support with proper positioning and posture- core stabilization  -     STG 3 Progress Met  -     STG 4 Pt will report sleeping throughout the night with min interuption due to pain   -     STG 4 Progress Met  -     Long Term Goals    LTG Date to Achieve 11/02/17  -     LTG 1 Improve core trunk stability to perform quadruped > plank, 5 x , holding 10 sec  -     LTG 1 Progress Met  -     LTG 2 Pt will demonstrate proper lifting mechanics  -     LTG 2 Progress Met  -     LTG 3 Pt will demonstrate functional trunk ROM with min c/o pain  -     LTG 3 Progress Partially Met  -     LTG 3 Progress Comments extension only ROM not met  -     Time Calculation    PT Goal Re-Cert Due Date 10/26/17  -       User Key  (r) = Recorded By, (t) = Taken By, (c) = Cosigned By    Initials Name Provider Type     Urszula Guerrero PTA Physical Therapy Assistant                Therapy Education       10/24/17 1500          Therapy Education    Education Details side planks  -      Given HEP;Symptoms/condition management;Pain management;Posture/body mechanics  -      Program Reinforced  -      How Provided Verbal;Demonstration  -      Provided to Patient  -      Level of Understanding Teach back education performed;Verbalized;Demonstrated  -        User Key  (r) = Recorded By, (t) = Taken By, (c) = Cosigned By     Initials Name Provider Type     Urszula Guerrero PTA Physical Therapy Assistant                Time Calculation:   Start Time: 1443  Stop Time: 1536  Time Calculation (min): 53 min  Total Timed Code Minutes- PT: 53 minute(s)    Therapy Charges for Today     Code Description Service Date Service Provider Modifiers Qty    02096877814 HC PT THER PROC EA 15 MIN 10/24/2017 Urszula Guerrero PTA GP 4    39083158928 HC PT THER SUPP EA 15 MIN 10/24/2017 Urszula Guerrero PTA GP 1                    Urszula Guerrero PTA  10/24/2017

## 2017-10-26 ENCOUNTER — HOSPITAL ENCOUNTER (OUTPATIENT)
Dept: PHYSICAL THERAPY | Facility: HOSPITAL | Age: 22
Setting detail: THERAPIES SERIES
Discharge: HOME | End: 2017-10-26

## 2017-10-26 DIAGNOSIS — G89.29 CHRONIC BILATERAL LOW BACK PAIN, WITH SCIATICA PRESENCE UNSPECIFIED: Primary | ICD-10-CM

## 2017-10-26 DIAGNOSIS — M54.5 CHRONIC BILATERAL LOW BACK PAIN, WITH SCIATICA PRESENCE UNSPECIFIED: Primary | ICD-10-CM

## 2017-10-26 PROCEDURE — 97110 THERAPEUTIC EXERCISES: CPT | Performed by: PHYSICAL THERAPIST
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
## 2017-11-21 ENCOUNTER — OFFICE VISIT (OUTPATIENT)
Dept: FAMILY MEDICINE CLINIC | Facility: CLINIC | Age: 22
End: 2017-11-21

## 2017-11-21 VITALS
SYSTOLIC BLOOD PRESSURE: 118 MMHG | TEMPERATURE: 97.9 F | BODY MASS INDEX: 36.65 KG/M2 | DIASTOLIC BLOOD PRESSURE: 74 MMHG | WEIGHT: 256 LBS | HEIGHT: 70 IN | HEART RATE: 72 BPM

## 2017-11-21 DIAGNOSIS — E66.9 OBESITY WITH SERIOUS COMORBIDITY, UNSPECIFIED CLASSIFICATION, UNSPECIFIED OBESITY TYPE: ICD-10-CM

## 2017-11-21 DIAGNOSIS — G89.29 CHRONIC MIDLINE LOW BACK PAIN WITH LEFT-SIDED SCIATICA: Primary | ICD-10-CM

## 2017-11-21 DIAGNOSIS — M54.42 CHRONIC MIDLINE LOW BACK PAIN WITH LEFT-SIDED SCIATICA: Primary | ICD-10-CM

## 2017-11-21 PROCEDURE — 99213 OFFICE O/P EST LOW 20 MIN: CPT | Performed by: NURSE PRACTITIONER

## 2017-11-21 RX ORDER — HYDROCODONE BITARTRATE AND ACETAMINOPHEN 7.5; 325 MG/1; MG/1
TABLET ORAL
Qty: 45 TABLET | Refills: 0 | Status: SHIPPED | OUTPATIENT
Start: 2017-11-21 | End: 2017-12-19 | Stop reason: SDUPTHER

## 2017-11-21 RX ORDER — TIZANIDINE 4 MG/1
4 TABLET ORAL EVERY 6 HOURS PRN
Qty: 120 TABLET | Refills: 2 | Status: SHIPPED | OUTPATIENT
Start: 2017-11-21 | End: 2018-03-21 | Stop reason: SDUPTHER

## 2017-11-21 RX ORDER — IBUPROFEN 600 MG/1
TABLET ORAL
Qty: 60 TABLET | Refills: 2 | Status: SHIPPED | OUTPATIENT
Start: 2017-11-21 | End: 2018-02-22 | Stop reason: SDUPTHER

## 2017-11-21 RX ORDER — PHENTERMINE HYDROCHLORIDE 30 MG/1
30 CAPSULE ORAL EVERY MORNING
Qty: 30 CAPSULE | Refills: 2 | Status: SHIPPED | OUTPATIENT
Start: 2017-11-21 | End: 2018-02-22

## 2017-11-21 RX ORDER — GABAPENTIN 300 MG/1
300 CAPSULE ORAL 3 TIMES DAILY
Qty: 90 CAPSULE | Refills: 2 | Status: SHIPPED | OUTPATIENT
Start: 2017-11-21 | End: 2018-02-22
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
## 2017-12-05 ENCOUNTER — HOSPITAL ENCOUNTER (OUTPATIENT)
Dept: MRI IMAGING | Facility: HOSPITAL | Age: 22
Discharge: HOME | End: 2017-12-05
Admitting: NURSE PRACTITIONER

## 2017-12-05 DIAGNOSIS — R93.7 ABNORMAL MRI, LUMBAR SPINE: Primary | ICD-10-CM

## 2017-12-05 DIAGNOSIS — G89.29 CHRONIC MIDLINE LOW BACK PAIN WITH LEFT-SIDED SCIATICA: ICD-10-CM

## 2017-12-05 DIAGNOSIS — M54.42 CHRONIC MIDLINE LOW BACK PAIN WITH LEFT-SIDED SCIATICA: ICD-10-CM

## 2017-12-05 PROCEDURE — 72148 MRI LUMBAR SPINE W/O DYE: CPT

## 2017-12-19 DIAGNOSIS — G89.29 CHRONIC MIDLINE LOW BACK PAIN WITH LEFT-SIDED SCIATICA: ICD-10-CM

## 2017-12-19 DIAGNOSIS — M54.42 CHRONIC MIDLINE LOW BACK PAIN WITH LEFT-SIDED SCIATICA: ICD-10-CM

## 2017-12-19 RX ORDER — HYDROCODONE BITARTRATE AND ACETAMINOPHEN 7.5; 325 MG/1; MG/1
TABLET ORAL
Qty: 45 TABLET | Refills: 0 | Status: SHIPPED | OUTPATIENT
Start: 2017-12-19 | End: 2018-01-22 | Stop reason: SDUPTHER

## 2017-12-22 ENCOUNTER — OFFICE VISIT (OUTPATIENT)
Dept: ORTHOPEDIC SURGERY | Facility: CLINIC | Age: 22
End: 2017-12-22

## 2017-12-22 VITALS — HEIGHT: 70 IN | BODY MASS INDEX: 36.94 KG/M2 | WEIGHT: 258 LBS

## 2017-12-22 DIAGNOSIS — M43.16 SPONDYLOLISTHESIS AT L4-L5 LEVEL: ICD-10-CM

## 2017-12-22 DIAGNOSIS — M54.50 LUMBAR SPINE PAIN: Primary | ICD-10-CM

## 2017-12-22 DIAGNOSIS — M43.06 SPONDYLOLYSIS OF LUMBAR REGION: ICD-10-CM

## 2017-12-22 PROCEDURE — 99204 OFFICE O/P NEW MOD 45 MIN: CPT | Performed by: ORTHOPAEDIC SURGERY

## 2017-12-22 NOTE — PROGRESS NOTES
Fabricio Ware is a 22 y.o. male   Primary provider:  SHANTA Morrison       Chief Complaint   Patient presents with   • Lumbar Spine - Pain       HISTORY OF PRESENT ILLNESS: Patient is here today for chronic lumbar spine pain. Patient states that in the last two years the pain has got much worse. Patient has had xrays and MRI of lumbar spine.     History of Present Illness   Complains of back pain, worse in the evening and early mornings.  The pain is dull and aching.  The pain does not radiate into the legs.  The pain is intermittent.  He is taking neurontin and ibuprofen.  He takes norco in the evenings.     CONCURRENT MEDICAL HISTORY:    No past medical history on file.    No Known Allergies      Current Outpatient Prescriptions:   •  gabapentin (NEURONTIN) 300 MG capsule, Take 1 capsule by mouth 3 (Three) Times a Day., Disp: 90 capsule, Rfl: 2  •  HYDROcodone-acetaminophen (NORCO) 7.5-325 MG per tablet, Take one po bid prn pain, Disp: 45 tablet, Rfl: 0  •  ibuprofen (ADVIL,MOTRIN) 600 MG tablet, Take one po bid prn back, Disp: 60 tablet, Rfl: 2  •  phentermine 30 MG capsule, Take 1 capsule by mouth Every Morning., Disp: 30 capsule, Rfl: 2  •  tiZANidine (ZANAFLEX) 4 MG tablet, Take 1 tablet by mouth Every 6 (Six) Hours As Needed for Muscle Spasms., Disp: 120 tablet, Rfl: 2    Past Surgical History:   Procedure Laterality Date   • CHOLECYSTECTOMY     • EYE SURGERY         Family History   Problem Relation Age of Onset   • Hypertension Father    • Hypertension Paternal Grandfather        Social History     Social History   • Marital status:      Spouse name: N/A   • Number of children: N/A   • Years of education: N/A     Occupational History   • Not on file.     Social History Main Topics   • Smoking status: Current Every Day Smoker     Packs/day: 0.50     Types: Cigarettes   • Smokeless tobacco: Never Used   • Alcohol use Yes      Comment: occasional   • Drug use: No   • Sexual activity:  "Defer     Other Topics Concern   • Not on file     Social History Narrative        Review of Systems   Constitutional: Negative for appetite change, chills, diaphoresis, fatigue, fever and unexpected weight change.   HENT: Negative.  Negative for congestion, dental problem, facial swelling, hearing loss, nosebleeds, postnasal drip, trouble swallowing and voice change.    Eyes: Negative.  Negative for pain, discharge, redness and itching.   Respiratory: Negative.  Negative for cough, choking, chest tightness, shortness of breath, wheezing and stridor.    Cardiovascular: Negative.  Negative for chest pain, palpitations and leg swelling.   Gastrointestinal: Negative.  Negative for abdominal pain, blood in stool, constipation, diarrhea and nausea.   Endocrine: Negative.  Negative for cold intolerance and heat intolerance.   Genitourinary: Negative.  Negative for dysuria, frequency, hematuria and urgency.   Musculoskeletal: Negative.  Negative for gait problem, joint swelling, neck pain and neck stiffness.   Skin: Negative.  Negative for pallor and rash.   Allergic/Immunologic: Negative.    Neurological: Negative for syncope, facial asymmetry, speech difficulty, weakness, numbness and headaches.   Hematological: Negative.    Psychiatric/Behavioral: Negative.  Negative for agitation, behavioral problems, confusion, decreased concentration, dysphoric mood and hallucinations. The patient is not nervous/anxious and is not hyperactive.        PHYSICAL EXAMINATION:       Ht 177.8 cm (70\")  Wt 117 kg (258 lb)  BMI 37.02 kg/m2    Physical Exam   Constitutional: He appears well-developed.   HENT:   Head: Normocephalic and atraumatic.   Eyes: Pupils are equal, round, and reactive to light. Right eye exhibits no discharge. Left eye exhibits no discharge.   Neck: Normal range of motion. No JVD present. No tracheal deviation present. No thyromegaly present.   Cardiovascular: Normal rate, regular rhythm, normal heart sounds and " intact distal pulses.  Exam reveals no gallop and no friction rub.    No murmur heard.  Pulmonary/Chest: Effort normal and breath sounds normal. No respiratory distress. He has no wheezes. He has no rales. He exhibits no tenderness.   Abdominal: Soft. Bowel sounds are normal. He exhibits no distension. There is no tenderness. There is no guarding.   Musculoskeletal: He exhibits no edema, tenderness or deformity.   Lymphadenopathy:     He has no cervical adenopathy.   Neurological: He is alert. He has normal reflexes. He displays normal reflexes. No cranial nerve deficit. Coordination normal.   Skin: Skin is warm. No rash noted. No erythema.   Psychiatric: He has a normal mood and affect. His behavior is normal. Thought content normal.       GAIT:     [x]  Normal  []  Antalgic    Assistive device: []  None  []  Walker     []  Crutches  []  Cane     []  Wheelchair  []  Stretcher    Right Ankle Exam     Muscle Strength   Dorsiflexion:  5/5  Plantar flexion:  5/5  Anterior tibial:  5/5  Posterior tibial:  5/5  Gastrocsoleus:  5/5  Peroneal muscle:  5/5      Left Ankle Exam     Muscle Strength   Dorsiflexion:  5/5   Plantar flexion:  5/5   Anterior tibial:  5/5   Posterior tibial:  5/5  Gastrocsoleus:  5/5  Peroneal muscle:  5/5      Right Hip Exam     Range of Motion   Internal Rotation: normal   External Rotation: normal     Muscle Strength   Abduction: 5/5   Flexion: 5/5       Left Hip Exam     Range of Motion   Internal Rotation: normal   External Rotation: normal     Muscle Strength   Abduction: 5/5   Flexion: 5/5       Back Exam     Tenderness   The patient is experiencing tenderness in the lumbar.    Range of Motion   Extension: 30   Flexion:  60 normal   Lateral Bend Right: 20   Lateral Bend Left: 20   Rotation Right: 30   Rotation Left: 30     Muscle Strength   Right Quadriceps:  5/5   Left Quadriceps:  5/5   Right Hamstrings:  5/5   Left Hamstrings:  5/5     Tests   Straight leg raise right: negative  Straight  leg raise left: negative    Reflexes   Patellar: 2/4  Achilles: 2/4  Biceps: 2/4  Babinski's sign: normal     Other   Sensation: normal  Gait: normal   Erythema: no back redness  Scars: absent    Comments:  Poor hip motion, unable to perform a straight leg raise beyond 60                      Mri Lumbar Spine Without Contrast    Result Date: 12/5/2017  Narrative: EXAM DESCRIPTION: MRI LUMBAR SPINE WO CONTRAST CLINICAL HISTORY: 22-year-old male with history given for chronic midline low back pain with left sided sciatica; rule out nerve compression. COMPARISON: Radiographic report 8/25/2017 is reviewed. These images are not available for comparison. FINDINGS:  Sagittal and axial T1 and T2 MR images of lumbar spine are submitted for interpretation . No incidental acute or suspicious finding within the included paraspinal soft tissues. The alignment is anatomic. The vertebral body heights are maintained without compression fracture deformity. There are Schmorl's nodes involving the endplates of L2-L3 and L4. There is associated surrounding marrow signal alteration. There is some endplate degenerative changes also at these levels. No suspicious marrow signal alteration or lesion. The conus terminates at L1 and demonstrates normal signal and morphology. There is loss of T2 disc signal and disc space height at L2-3, L3-4, L4-5. T12-L1:  There is a mild generalized disc bulge which contributes to flattening of the ventral thecal sac and mild central spinal canal stenosis. No cord compression. The foramina are adequate. L1-L2:  No significant disc bulge, protrusion, or stenosis. L2-L3:  There is mild generalized disc bulge without focal protrusion. Mild flattening of the ventral thecal sac. Mild central spinal canal stenosis. The foramina are patent. L3-L4: Minimal disc bulge without protrusion. There is mild thickening of the ligamentum flavum. There is mild central spinal canal stenosis. The foramina are adequate. L4-L5:  There are findings suggesting pars defects at L4 and facet arthritic change. There is disc bulge eccentric to the right. No significant compromise of the thecal sac at this level. There is moderate right and mild left foraminal stenosis. L5-S1: No significant disc bulge, protrusion, or stenosis.     Impression: Schmorl's nodes and degenerative disc disease most pronounced at L2-3, L3-4 and L4-5. Findings suggesting pars defects at L4 and facet arthropathy. There is L4/5 disc bulge eccentric to the right with findings contributing to moderate right and mild left foraminal stenosis. See above report for full details of individual levels. Electronically signed by:  Tacho Ellis MD  12/5/2017 2:26 PM CST Workstation: [id number]    I reviewed MRI of the lumbar spine 12/5/17, degenerative disc changes, L4 spondylolysis, L4-5 spondylolisthesis  Xray lumbar spine 8/25/17 L4 spondylolysis.        ASSESSMENT:    Diagnoses and all orders for this visit:    Lumbar spine pain    Spondylolysis of lumbar region    Spondylolisthesis at L4-L5 level          PLAN  I reviewed treatment options, recommend weight loss, he needs to stop smoking,  I discussed treatment options, I advised him that surgery will be necessary at some point in the future.          Familia Ortega MD

## 2018-01-22 DIAGNOSIS — G89.29 CHRONIC MIDLINE LOW BACK PAIN WITH LEFT-SIDED SCIATICA: ICD-10-CM

## 2018-01-22 DIAGNOSIS — M54.42 CHRONIC MIDLINE LOW BACK PAIN WITH LEFT-SIDED SCIATICA: ICD-10-CM

## 2018-01-22 RX ORDER — HYDROCODONE BITARTRATE AND ACETAMINOPHEN 7.5; 325 MG/1; MG/1
TABLET ORAL
Qty: 45 TABLET | Refills: 0 | Status: SHIPPED | OUTPATIENT
Start: 2018-01-22 | End: 2018-02-22 | Stop reason: SDUPTHER

## 2018-02-22 ENCOUNTER — OFFICE VISIT (OUTPATIENT)
Dept: FAMILY MEDICINE CLINIC | Facility: CLINIC | Age: 23
End: 2018-02-22

## 2018-02-22 VITALS
BODY MASS INDEX: 35.93 KG/M2 | DIASTOLIC BLOOD PRESSURE: 60 MMHG | WEIGHT: 251 LBS | SYSTOLIC BLOOD PRESSURE: 110 MMHG | HEART RATE: 76 BPM | TEMPERATURE: 97.8 F | HEIGHT: 70 IN

## 2018-02-22 DIAGNOSIS — M54.42 CHRONIC MIDLINE LOW BACK PAIN WITH LEFT-SIDED SCIATICA: Primary | ICD-10-CM

## 2018-02-22 DIAGNOSIS — G89.29 CHRONIC MIDLINE LOW BACK PAIN WITH LEFT-SIDED SCIATICA: Primary | ICD-10-CM

## 2018-02-22 DIAGNOSIS — E66.9 OBESITY WITH SERIOUS COMORBIDITY, UNSPECIFIED CLASSIFICATION, UNSPECIFIED OBESITY TYPE: ICD-10-CM

## 2018-02-22 PROCEDURE — 99213 OFFICE O/P EST LOW 20 MIN: CPT | Performed by: NURSE PRACTITIONER

## 2018-02-22 RX ORDER — PHENTERMINE HYDROCHLORIDE 37.5 MG/1
37.5 TABLET ORAL EVERY MORNING
Qty: 30 TABLET | Refills: 2 | Status: SHIPPED | OUTPATIENT
Start: 2018-02-22

## 2018-02-22 RX ORDER — GABAPENTIN 300 MG/1
300 CAPSULE ORAL 3 TIMES DAILY
Qty: 90 CAPSULE | Refills: 2 | Status: CANCELLED | OUTPATIENT
Start: 2018-02-22

## 2018-02-22 RX ORDER — HYDROCODONE BITARTRATE AND ACETAMINOPHEN 7.5; 325 MG/1; MG/1
TABLET ORAL
Qty: 20 TABLET | Refills: 0 | Status: SHIPPED | OUTPATIENT
Start: 2018-02-22

## 2018-02-22 RX ORDER — GABAPENTIN 600 MG/1
600 TABLET ORAL 3 TIMES DAILY
Qty: 90 TABLET | Refills: 2 | Status: SHIPPED | OUTPATIENT
Start: 2018-02-22

## 2018-02-22 RX ORDER — IBUPROFEN 600 MG/1
TABLET ORAL
Qty: 60 TABLET | Refills: 2 | Status: SHIPPED | OUTPATIENT
Start: 2018-02-22 | End: 2018-03-21 | Stop reason: SDUPTHER

## 2018-02-22 RX ORDER — PHENTERMINE HYDROCHLORIDE 30 MG/1
30 CAPSULE ORAL EVERY MORNING
Qty: 30 CAPSULE | Refills: 2 | Status: CANCELLED | OUTPATIENT
Start: 2018-02-22

## 2018-03-21 DIAGNOSIS — M54.42 CHRONIC MIDLINE LOW BACK PAIN WITH LEFT-SIDED SCIATICA: ICD-10-CM

## 2018-03-21 DIAGNOSIS — G89.29 CHRONIC MIDLINE LOW BACK PAIN WITH LEFT-SIDED SCIATICA: ICD-10-CM

## 2018-03-21 DIAGNOSIS — Z79.899 LONG TERM USE OF DRUG: Primary | ICD-10-CM

## 2018-03-21 RX ORDER — TIZANIDINE 4 MG/1
4 TABLET ORAL EVERY 6 HOURS PRN
Qty: 120 TABLET | Refills: 2 | Status: SHIPPED | OUTPATIENT
Start: 2018-03-21

## 2018-03-21 RX ORDER — IBUPROFEN 600 MG/1
TABLET ORAL
Qty: 60 TABLET | Refills: 2 | Status: SHIPPED | OUTPATIENT
Start: 2018-03-21

## 2018-03-21 NOTE — TELEPHONE ENCOUNTER
Received note from prescription refill line to refill [place] for Hasmukh Pt.  Spoke with SHANTA and she decided to refer to pain management and refill ibuprofen and zanaflex.  Medications ordered and referral placed

## 2023-06-01 ENCOUNTER — APPOINTMENT (OUTPATIENT)
Dept: CT IMAGING | Facility: HOSPITAL | Age: 28
End: 2023-06-01
Payer: COMMERCIAL

## 2023-06-01 ENCOUNTER — HOSPITAL ENCOUNTER (EMERGENCY)
Facility: HOSPITAL | Age: 28
Discharge: HOME | End: 2023-06-01
Attending: EMERGENCY MEDICINE
Payer: COMMERCIAL

## 2023-06-01 VITALS
OXYGEN SATURATION: 97 % | WEIGHT: 215 LBS | HEART RATE: 74 BPM | RESPIRATION RATE: 18 BRPM | HEIGHT: 70 IN | BODY MASS INDEX: 30.78 KG/M2 | DIASTOLIC BLOOD PRESSURE: 71 MMHG | SYSTOLIC BLOOD PRESSURE: 140 MMHG | TEMPERATURE: 97.8 F

## 2023-06-01 DIAGNOSIS — K52.9 GASTROENTERITIS: Primary | ICD-10-CM

## 2023-06-01 LAB
ALBUMIN SERPL-MCNC: 4.3 G/DL (ref 3.5–5.2)
ALBUMIN/GLOB SERPL: 1.5 G/DL
ALP SERPL-CCNC: 75 U/L (ref 39–117)
ALT SERPL W P-5'-P-CCNC: 16 U/L (ref 1–41)
AMPHET+METHAMPHET UR QL: NEGATIVE
AMPHETAMINES UR QL: NEGATIVE
ANION GAP SERPL CALCULATED.3IONS-SCNC: 12 MMOL/L (ref 5–15)
AST SERPL-CCNC: 19 U/L (ref 1–40)
BARBITURATES UR QL SCN: NEGATIVE
BASOPHILS # BLD AUTO: 0.02 10*3/MM3 (ref 0–0.2)
BASOPHILS NFR BLD AUTO: 0.1 % (ref 0–1.5)
BENZODIAZ UR QL SCN: NEGATIVE
BILIRUB SERPL-MCNC: 0.5 MG/DL (ref 0–1.2)
BILIRUB UR QL STRIP: NEGATIVE
BUN SERPL-MCNC: 10 MG/DL (ref 6–20)
BUN/CREAT SERPL: 16.7 (ref 7–25)
BUPRENORPHINE SERPL-MCNC: NEGATIVE NG/ML
CALCIUM SPEC-SCNC: 9.1 MG/DL (ref 8.6–10.5)
CANNABINOIDS SERPL QL: POSITIVE
CHLORIDE SERPL-SCNC: 103 MMOL/L (ref 98–107)
CK SERPL-CCNC: 80 U/L (ref 20–200)
CLARITY UR: CLEAR
CO2 SERPL-SCNC: 22 MMOL/L (ref 22–29)
COCAINE UR QL: NEGATIVE
COLOR UR: YELLOW
CREAT SERPL-MCNC: 0.6 MG/DL (ref 0.76–1.27)
DEPRECATED RDW RBC AUTO: 42.3 FL (ref 37–54)
EGFRCR SERPLBLD CKD-EPI 2021: 133.2 ML/MIN/1.73
EOSINOPHIL # BLD AUTO: 0 10*3/MM3 (ref 0–0.4)
EOSINOPHIL NFR BLD AUTO: 0 % (ref 0.3–6.2)
ERYTHROCYTE [DISTWIDTH] IN BLOOD BY AUTOMATED COUNT: 12.7 % (ref 12.3–15.4)
FENTANYL UR-MCNC: NEGATIVE NG/ML
GLOBULIN UR ELPH-MCNC: 2.9 GM/DL
GLUCOSE SERPL-MCNC: 113 MG/DL (ref 65–99)
GLUCOSE UR STRIP-MCNC: NEGATIVE MG/DL
HCT VFR BLD AUTO: 39.6 % (ref 37.5–51)
HGB BLD-MCNC: 14.1 G/DL (ref 13–17.7)
HGB UR QL STRIP.AUTO: NEGATIVE
IMM GRANULOCYTES # BLD AUTO: 0.08 10*3/MM3 (ref 0–0.05)
IMM GRANULOCYTES NFR BLD AUTO: 0.6 % (ref 0–0.5)
KETONES UR QL STRIP: ABNORMAL
LEUKOCYTE ESTERASE UR QL STRIP.AUTO: NEGATIVE
LIPASE SERPL-CCNC: 13 U/L (ref 13–60)
LYMPHOCYTES # BLD AUTO: 0.92 10*3/MM3 (ref 0.7–3.1)
LYMPHOCYTES NFR BLD AUTO: 6.8 % (ref 19.6–45.3)
MAGNESIUM SERPL-MCNC: 1.6 MG/DL (ref 1.6–2.6)
MCH RBC QN AUTO: 32.2 PG (ref 26.6–33)
MCHC RBC AUTO-ENTMCNC: 35.6 G/DL (ref 31.5–35.7)
MCV RBC AUTO: 90.4 FL (ref 79–97)
METHADONE UR QL SCN: NEGATIVE
MONOCYTES # BLD AUTO: 0.39 10*3/MM3 (ref 0.1–0.9)
MONOCYTES NFR BLD AUTO: 2.9 % (ref 5–12)
NEUTROPHILS NFR BLD AUTO: 12.15 10*3/MM3 (ref 1.7–7)
NEUTROPHILS NFR BLD AUTO: 89.6 % (ref 42.7–76)
NITRITE UR QL STRIP: NEGATIVE
NRBC BLD AUTO-RTO: 0 /100 WBC (ref 0–0.2)
NT-PROBNP SERPL-MCNC: 182.7 PG/ML (ref 0–450)
OPIATES UR QL: NEGATIVE
OXYCODONE UR QL SCN: NEGATIVE
PCP UR QL SCN: NEGATIVE
PH UR STRIP.AUTO: 7 [PH] (ref 5–9)
PLATELET # BLD AUTO: 240 10*3/MM3 (ref 140–450)
PMV BLD AUTO: 10.2 FL (ref 6–12)
POTASSIUM SERPL-SCNC: 3.6 MMOL/L (ref 3.5–5.2)
PROPOXYPH UR QL: NEGATIVE
PROT SERPL-MCNC: 7.2 G/DL (ref 6–8.5)
PROT UR QL STRIP: ABNORMAL
RBC # BLD AUTO: 4.38 10*6/MM3 (ref 4.14–5.8)
SODIUM SERPL-SCNC: 137 MMOL/L (ref 136–145)
SP GR UR STRIP: 1.03 (ref 1–1.03)
TRICYCLICS UR QL SCN: NEGATIVE
TROPONIN T SERPL HS-MCNC: 6 NG/L
UROBILINOGEN UR QL STRIP: ABNORMAL
WBC NRBC COR # BLD: 13.56 10*3/MM3 (ref 3.4–10.8)

## 2023-06-01 PROCEDURE — 80053 COMPREHEN METABOLIC PANEL: CPT | Performed by: EMERGENCY MEDICINE

## 2023-06-01 PROCEDURE — 96375 TX/PRO/DX INJ NEW DRUG ADDON: CPT

## 2023-06-01 PROCEDURE — 25010000002 PROCHLORPERAZINE 10 MG/2ML SOLUTION: Performed by: EMERGENCY MEDICINE

## 2023-06-01 PROCEDURE — 96374 THER/PROPH/DIAG INJ IV PUSH: CPT

## 2023-06-01 PROCEDURE — 99283 EMERGENCY DEPT VISIT LOW MDM: CPT

## 2023-06-01 PROCEDURE — 25010000002 ONDANSETRON PER 1 MG: Performed by: EMERGENCY MEDICINE

## 2023-06-01 PROCEDURE — 80307 DRUG TEST PRSMV CHEM ANLYZR: CPT | Performed by: EMERGENCY MEDICINE

## 2023-06-01 PROCEDURE — 36415 COLL VENOUS BLD VENIPUNCTURE: CPT

## 2023-06-01 PROCEDURE — 82550 ASSAY OF CK (CPK): CPT | Performed by: EMERGENCY MEDICINE

## 2023-06-01 PROCEDURE — 93005 ELECTROCARDIOGRAM TRACING: CPT | Performed by: EMERGENCY MEDICINE

## 2023-06-01 PROCEDURE — 93005 ELECTROCARDIOGRAM TRACING: CPT

## 2023-06-01 PROCEDURE — 84484 ASSAY OF TROPONIN QUANT: CPT | Performed by: EMERGENCY MEDICINE

## 2023-06-01 PROCEDURE — 83690 ASSAY OF LIPASE: CPT | Performed by: EMERGENCY MEDICINE

## 2023-06-01 PROCEDURE — 25510000001 IOPAMIDOL 61 % SOLUTION: Performed by: EMERGENCY MEDICINE

## 2023-06-01 PROCEDURE — 83880 ASSAY OF NATRIURETIC PEPTIDE: CPT | Performed by: EMERGENCY MEDICINE

## 2023-06-01 PROCEDURE — 74177 CT ABD & PELVIS W/CONTRAST: CPT

## 2023-06-01 PROCEDURE — 85025 COMPLETE CBC W/AUTO DIFF WBC: CPT | Performed by: EMERGENCY MEDICINE

## 2023-06-01 PROCEDURE — 83735 ASSAY OF MAGNESIUM: CPT | Performed by: EMERGENCY MEDICINE

## 2023-06-01 PROCEDURE — 81003 URINALYSIS AUTO W/O SCOPE: CPT | Performed by: EMERGENCY MEDICINE

## 2023-06-01 RX ORDER — SODIUM CHLORIDE 0.9 % (FLUSH) 0.9 %
10 SYRINGE (ML) INJECTION AS NEEDED
Status: DISCONTINUED | OUTPATIENT
Start: 2023-06-01 | End: 2023-06-02 | Stop reason: HOSPADM

## 2023-06-01 RX ORDER — PROCHLORPERAZINE EDISYLATE 5 MG/ML
10 INJECTION INTRAMUSCULAR; INTRAVENOUS ONCE
Status: COMPLETED | OUTPATIENT
Start: 2023-06-01 | End: 2023-06-01

## 2023-06-01 RX ORDER — ONDANSETRON 4 MG/1
4 TABLET, ORALLY DISINTEGRATING ORAL EVERY 6 HOURS PRN
Qty: 10 TABLET | Refills: 0 | Status: SHIPPED | OUTPATIENT
Start: 2023-06-01

## 2023-06-01 RX ORDER — ONDANSETRON 2 MG/ML
4 INJECTION INTRAMUSCULAR; INTRAVENOUS ONCE
Status: COMPLETED | OUTPATIENT
Start: 2023-06-01 | End: 2023-06-01

## 2023-06-01 RX ADMIN — PROCHLORPERAZINE EDISYLATE 10 MG: 5 INJECTION INTRAMUSCULAR; INTRAVENOUS at 19:47

## 2023-06-01 RX ADMIN — IOPAMIDOL 90 ML: 612 INJECTION, SOLUTION INTRAVENOUS at 19:57

## 2023-06-01 RX ADMIN — SODIUM CHLORIDE 1000 ML: 9 INJECTION, SOLUTION INTRAVENOUS at 19:47

## 2023-06-01 RX ADMIN — ONDANSETRON 4 MG: 2 INJECTION INTRAMUSCULAR; INTRAVENOUS at 23:51

## 2023-06-02 LAB
HOLD SPECIMEN: NORMAL
WHOLE BLOOD HOLD COAG: NORMAL

## 2023-06-02 NOTE — ED NOTES
Pt presents to the ED with complaints of abdominal pain and heart rate in the low 40s. Pt family states that he has been to [facility] for the same thing once before and they sent him home after observing him for two days. Pt family states that he hasn't had an episode in over a year. Pt states that he has been vomiting non stop since 6 this morning.

## 2023-06-02 NOTE — DISCHARGE INSTRUCTIONS
Please return new or worsening symptoms.  Follow-up with primary care.  [place] diet and advance as tolerated.

## 2023-06-02 NOTE — ED PROVIDER NOTES
Subjective   History of Present Illness    Review of Systems    No past medical history on file.    No Known Allergies    Past Surgical History:   Procedure Laterality Date   • CHOLECYSTECTOMY     • EYE SURGERY         Family History   Problem Relation Age of Onset   • Hypertension Father    • Hypertension Paternal Grandfather        Social History     Socioeconomic History   • Marital status:    Tobacco Use   • Smoking status: Every Day     Packs/day: 0.50     Types: Cigarettes   • Smokeless tobacco: Never   Substance and Sexual Activity   • Alcohol use: Yes     Comment: occasional   • Drug use: No   • Sexual activity: Defer           Objective   Physical Exam    ECG 12 Lead      Date/Time: 6/1/2023 6:52 PM  Performed by: Oswaldo Bergeron DO  Authorized by: Oswaldo Bergeron DO   Interpreted by physician  Comments: EKG June 1, 2023 at 1850 reveals sinus bradycardia rate of 56 bpm.  No ST elevation or depression.  No evidence of acute ischemia.  QTc 463.                 ED Course      Labs Reviewed   COMPREHENSIVE METABOLIC PANEL - Abnormal; Notable for the following components:       Result Value    Glucose 113 (*)     Creatinine 0.60 (*)     All other components within normal limits    Narrative:     GFR Normal >60  Chronic Kidney Disease <60  Kidney Failure <15     CBC WITH AUTO DIFFERENTIAL - Abnormal; Notable for the following components:    WBC 13.56 (*)     Neutrophil % 89.6 (*)     Lymphocyte % 6.8 (*)     Monocyte % 2.9 (*)     Eosinophil % 0.0 (*)     Immature Grans % 0.6 (*)     Neutrophils, Absolute 12.15 (*)     Immature Grans, Absolute 0.08 (*)     All other components within normal limits   URINALYSIS W/ MICROSCOPIC IF INDICATED (NO CULTURE) - Abnormal; Notable for the following components:    Ketones, UA 40 mg/dL (2+) (*)     Protein, UA Trace (*)     All other components within normal limits    Narrative:     Urine microscopic not indicated.   URINE DRUG SCREEN - Abnormal; Notable for  the following components:    THC, Screen, Urine Positive (*)     All other components within normal limits    Narrative:     Cutoff For Drugs Screened:    Amphetamines               500 ng/ml  Barbiturates               200 ng/ml  Benzodiazepines            150 ng/ml  Cocaine                    150 ng/ml  Methadone                  200 ng/ml  Opiates                    100 ng/ml  Phencyclidine               25 ng/ml  THC                            50 ng/ml  Methamphetamine            500 ng/ml  Tricyclic Antidepressants  300 ng/ml  Oxycodone                  100 ng/ml  Propoxyphene               300 ng/ml  Buprenorphine               10 ng/ml    The normal value for all drugs tested is negative. This report includes unconfirmed screening results, with the cutoff values listed, to be used for medical treatment purposes only.  Unconfirmed results must not be used for non-medical purposes such as employment or legal testing.  Clinical consideration should be applied to any drug of abuse test, particularly when unconfirmed results are used.     LIPASE - Normal   BNP (IN-HOUSE) - Normal    Narrative:     Among patients with dyspnea, NT-proBNP is highly sensitive for the detection of acute congestive heart failure. In addition NT-proBNP of <300 pg/ml effectively rules out acute congestive heart failure with 99% negative predictive value.     SINGLE HSTROPONIN T - Normal    Narrative:     High Sensitive Troponin T Reference Range:  <10.0 ng/L- Negative Female for AMI  <15.0 ng/L- Negative Male for AMI  >=10 - Abnormal Female indicating possible myocardial injury.  >=15 - Abnormal Male indicating possible myocardial injury.   Clinicians would have to utilize clinical acumen, EKG, Troponin, and serial changes to determine if it is an Acute Myocardial Infarction or myocardial injury due to an underlying chronic condition.        MAGNESIUM - Normal   CK - Normal   FENTANYL, URINE - Normal    Narrative:     Negative Threshold:       Fentanyl 5 ng/mL     The normal value for the drug tested is negative. This report includes final unconfirmed screening results to be used for medical treatment purposes only. Unconfirmed results must not be used for non-medical purposes such as employment or legal testing. Clinical consideration should be applied to any drug of abuse test, particularly when unconfirmed results are used.          CBC AND DIFFERENTIAL    Narrative:     The following orders were created for panel order CBC & Differential.  Procedure                               Abnormality         Status                     ---------                               -----------         ------                     CBC Auto Differential[[id number]]        Abnormal            Final result                 Please view results for these tests on the individual orders.   EXTRA TUBES    Narrative:     The following orders were created for panel order Extra Tubes.  Procedure                               Abnormality         Status                     ---------                               -----------         ------                     Gold Top - SST[[id number]]                                   In process                 Light Blue Top[[id number]]                                   In process                   Please view results for these tests on the individual orders.   GOLD TOP - SST   LIGHT BLUE TOP     CT Abdomen Pelvis With Contrast    Result Date: 6/1/2023  Narrative: HISTORY: Abdominal pain and vomiting. COMPARISON: None TECHNIQUE: Intravenous contrast was administered and axial images from the level of the diaphragms through the pelvis were performed followed by 2D multiplanar reformats. FINDINGS: Lung bases are clear. There are no osseous lesions. The liver, spleen, pancreas, kidneys and adrenals are normal. The appendix is normal. There are no dilated loops of bowel. There is no free air or bowel obstruction.     Impression: No acute inflammatory  process is identified in the abdomen or pelvis.                                         MDM    Final diagnoses:   None       ED Disposition  ED Disposition     None          No follow-up provider specified.       Medication List      No changes were made to your prescriptions during this visit.        these tests on the individual orders.   EXTRA TUBES    Narrative:     The following orders were created for panel order Extra Tubes.  Procedure                               Abnormality         Status                     ---------                               -----------         ------                     Gold Top - SST[[id number]]                                   Final result               Light Blue Top[[id number]]                                   Final result                 Please view results for these tests on the individual orders.   [facility]   LIGHT BLUE TOP     CT Abdomen Pelvis With Contrast    Result Date: 6/1/2023  Narrative: HISTORY: Abdominal pain and vomiting. COMPARISON: None TECHNIQUE: Intravenous contrast was administered and axial images from the level of the diaphragms through the pelvis were performed followed by 2D multiplanar reformats. FINDINGS: Lung bases are clear. There are no osseous lesions. The liver, spleen, pancreas, kidneys and adrenals are normal. The appendix is normal. There are no dilated loops of bowel. There is no free air or bowel obstruction.     Impression: No acute inflammatory process is identified in the abdomen or pelvis.               Medical Decision Making  Problems Addressed:  Gastroenteritis: complicated acute illness or injury    Amount and/or Complexity of Data Reviewed  Labs: ordered.  Radiology: ordered.  ECG/medicine tests: ordered and independent interpretation performed.    Risk  Prescription drug management.      CT imaging reveals no acute abdomen.  No overt dehydration.  2+ ketones in urine.  No signs of DKA.  THC positive which could be contributory to cyclic vomiting.  This also could be a viral illness.  Patient's heart rate likely secondary to issues with repeated vomiting.  The treatment of nausea and fluid hydration patient is much improved.  Heart rate improved to normal.  He is feeling much better.  Advised on outpatient follow-up and they  are agreeable to discharge at this time.    Final diagnoses:   Gastroenteritis          ED Disposition  ED Disposition       ED Disposition   Discharge    Condition   Stable    Comment   --               Jose Car MD  [address] [address] [phone]               Medication List        New Prescriptions      ondansetron ODT 4 MG disintegrating tablet  Commonly known as: ZOFRAN-ODT  Place 1 tablet on the tongue Every 6 (Six) Hours As Needed for Nausea or Vomiting.               Where to Get Your Medications        These medications were sent to [facility] [id number] - [place], [place] - [address] AT [facility] [address] - [phone]  - [phone]   [address], [facility] [address]      Phone: [phone]   ondansetron ODT 4 MG disintegrating tablet          Oswaldo Bergeron,   06/07/23 0527

## 2023-06-03 LAB
QT INTERVAL: 480 MS
QTC INTERVAL: 463 MS